# Patient Record
Sex: MALE | Race: OTHER | Employment: UNEMPLOYED | URBAN - METROPOLITAN AREA
[De-identification: names, ages, dates, MRNs, and addresses within clinical notes are randomized per-mention and may not be internally consistent; named-entity substitution may affect disease eponyms.]

---

## 2018-12-20 ENCOUNTER — ED HISTORICAL/CONVERTED ENCOUNTER (OUTPATIENT)
Dept: OTHER | Age: 18
End: 2018-12-20

## 2019-04-11 ENCOUNTER — OFFICE VISIT (OUTPATIENT)
Dept: FAMILY MEDICINE CLINIC | Age: 19
End: 2019-04-11

## 2019-04-11 VITALS
HEART RATE: 54 BPM | BODY MASS INDEX: 19.45 KG/M2 | WEIGHT: 143.6 LBS | RESPIRATION RATE: 18 BRPM | HEIGHT: 72 IN | SYSTOLIC BLOOD PRESSURE: 102 MMHG | DIASTOLIC BLOOD PRESSURE: 68 MMHG | TEMPERATURE: 97.5 F

## 2019-04-11 DIAGNOSIS — G91.9 HYDROCEPHALUS, UNSPECIFIED TYPE (HCC): ICD-10-CM

## 2019-04-11 DIAGNOSIS — F79 INTELLECTUAL DISABILITY: Primary | ICD-10-CM

## 2019-04-11 DIAGNOSIS — F39 MOOD DISORDER (HCC): ICD-10-CM

## 2019-04-11 RX ORDER — OLANZAPINE 20 MG/1
TABLET ORAL
COMMUNITY
Start: 2019-04-09

## 2019-04-11 RX ORDER — LITHIUM CARBONATE 300 MG/1
TABLET, FILM COATED, EXTENDED RELEASE ORAL
COMMUNITY
Start: 2019-04-09

## 2019-04-11 RX ORDER — LAMOTRIGINE 25 MG/1
TABLET ORAL 2 TIMES DAILY
COMMUNITY
Start: 2019-04-09

## 2019-04-11 RX ORDER — CITALOPRAM 20 MG/1
TABLET, FILM COATED ORAL DAILY
COMMUNITY
Start: 2019-04-09

## 2019-04-11 RX ORDER — LITHIUM CARBONATE 450 MG/1
450 TABLET ORAL DAILY
COMMUNITY

## 2019-04-11 RX ORDER — TRAZODONE HYDROCHLORIDE 50 MG/1
TABLET ORAL
COMMUNITY

## 2019-04-11 RX ORDER — BUSPIRONE HYDROCHLORIDE 5 MG/1
TABLET ORAL
COMMUNITY
Start: 2019-04-09

## 2019-04-11 NOTE — PROGRESS NOTES
Chief Complaint   Patient presents with   [facility] Establish Care       1. Have you been to the ER, urgent care clinic since your last visit? Hospitalized since your last visit? No    2. Have you seen or consulted any other health care providers outside of the [address] [place] [address] since your last visit? Include any pap smears or colon screening.  No

## 2019-04-11 NOTE — PROGRESS NOTES
Family Medicine Initial Office Visit  Patient: Jm Baca  2000, 19 y.o., male  Encounter Date: 2019    ASSESSMENT & PLAN    ICD-10-CM ICD-9-CM    1. Intellectual disability F79 319    2. Mood disorder (HCC) F39 296.90    3. Hydrocephalus, unspecified type G91.9 331.4      Orders Placed This Encounter    citalopram (CELEXA) 20 mg tablet     Sig: daily.  OLANZapine (ZYPREXA) 20 mg tablet     Sig: nightly.  busPIRone (BUSPAR) 5 mg tablet     Si tab TID    lithium carbonate SR (LITHOBID) 300 mg CR tablet     Sig: nightly. 2 tabs at night    lamoTRIgine (LAMICTAL) 25 mg tablet     Sig: two (2) times a day. 2 tabs BID    traZODone (DESYREL) 50 mg tablet     Sig: Take  by mouth nightly. 50 mg As needed at HS    lithium carbonate CR (ESKALITH CR) 450 mg CR tablet     Sig: Take 450 mg by mouth daily. 1 tab daily    human papillomav vac,9-mohsen,PF, (GARDASIL 9, PF,) susp injection     Si.5 mL by IntraMUSCular route once for 1 dose. Dispense:  0.5 mL     Refill:  1     Had shot # 1 at prior doctors office, please admin 2 and 3. Last shot in . Ean Parrish is here today with his mom to establish care. I will request records from all of his prior doctors, mom is feeling paperwork out here in the office. She needs paperwork filled out for his medical needs and personal care services referral, A [id number]-[id number]. Have asked her many of these questions in the office today, but require records review before I can complete the form.   I will let her know when the form is completed but I have asked her to call in 2 weeks if she is not heard from us so that I can verify that all of the information is they are  He should continue to follow with psychiatry and take all medications as prescribed  Call with questions or concerns  Gardasil recommended and okay to use Flonase for nasal congestion and postnasal drip  CHIEF COMPLAINT  Chief Complaint   Patient presents with   [address] is a 19 y.o. male presenting today for establishing care. Patient works as disabled, goes to day program [employer]. Patient lives in a house with mom, 2 younger brothers and mom's fiance. Patient was last seen by primary care ([facility])--[facility]ian at [facility] in [place]. Patient last saw a dentist Melonie Clemons within the last year  Patient last had eye exam mom reports maybe a few years. Exercise: plays basketball, goes for walks (Therapeutic)  Diet / Weight: eats a healthy diet, weight is stable. Tobacco: 1/2 PPD cigarette smoking (mom reports they limit)--he was going to pick them up out of the street. EtOH:no  Illicit Substances:no    Psychiatry: follows with [facility], follows with Dr Raine Doe there ([place]) but had a psychological testing at [facility]    Followed in the past with neurosurgery b/c of hydrocephalus. PMH:  Intellectual disability disorder  \"autistic traits\"  Hx of psychosis with avh--was hospitalized \"tons\" of times, most recently [facility] in [facility], before that [address] [facility], once [place] [address] with certificate of completion from [facility] is here with him to advocate for him, has brought all their paperwork and will fill out records requests. Review of Systems   Unable to perform ROS: Psychiatric disorder   Patient is an unreliable historian due to intellectual disability but he denies all complaints today. OBJECTIVE  Visit Vitals  /68 (BP 1 Location: Left arm, BP Patient Position: Sitting)   Pulse (!) 54   Temp 97.5 °F (36.4 °C) (Oral)   Resp 18   Ht 5' 11.5\" (1.816 m)   Wt 143 lb 9.6 oz (65.1 kg)   BMI 19.75 kg/m²       Physical Exam   Constitutional: He is oriented to person, place, and time. He appears well-developed and well-nourished. No distress.    NAD, Nontoxic, Appears Stated Age, tall and thin, answers questions appropriately when asked, defers to mother often   HENT:   Head: Normocephalic and atraumatic. Right Ear: External ear normal.   Left Ear: External ear normal.   Mouth/Throat: Oropharynx is clear and moist. No oropharyngeal exudate. Plagiocephaly   Eyes: Conjunctivae and EOM are normal. Right eye exhibits no discharge. Left eye exhibits no discharge. No scleral icterus. Neck: Neck supple. No thyromegaly present. Cardiovascular: Normal rate, regular rhythm and normal heart sounds. No murmur heard. Pulmonary/Chest: Effort normal and breath sounds normal. No stridor. No respiratory distress. He has no wheezes. He has no rales. Abdominal: Soft. Bowel sounds are normal. He exhibits no distension. There is no tenderness. Well-healed abdominal scar status post surgery and right upper and left upper quadrants   Musculoskeletal: He exhibits no edema or tenderness. Neurological: He is alert and oriented to person, place, and time. Grossly intact CN   Skin: Skin is warm and dry. No rash noted. He is not diaphoretic. Psychiatric: He has a normal mood and affect. His behavior is normal.   Poor eye contact, withdrawn, says \"I do not like when you talk about me hurting people, I do not do it on purpose. \"   Nursing note and vitals reviewed. No results found for any visits on 04/11/19. HISTORICAL  Reviewed and updated today, and as noted below:    History reviewed. No pertinent past medical history.   Past Surgical History:   Procedure Laterality Date    NEUROLOGICAL PROCEDURE UNLISTED       Family History   Problem Relation Age of Onset    No Known Problems Mother     Psychiatric Disorder Father      Social History     Tobacco Use   Smoking Status Current Every Day Smoker   Smokeless Tobacco Never Used     Social History     Socioeconomic History    Marital status: SINGLE     Spouse name: Not on file    Number of children: Not on file    Years of education: Not on file    Highest education level: Not on file   Tobacco Use    Smoking status: Current Every Day Smoker    Smokeless tobacco: Never Used     No Known Allergies    No results found for any previous visit. Shilo Murphy MD  [facility]  04/11/19 8:39 AM    Portions of this note may have been populated using smart dictation software and may have \"sounds-like\" errors present.

## 2019-04-30 ENCOUNTER — TELEPHONE (OUTPATIENT)
Dept: FAMILY MEDICINE CLINIC | Age: 19
End: 2019-04-30

## 2019-04-30 NOTE — TELEPHONE ENCOUNTER
Patient's mother Gege Crawford) is calling inquiring on forms that were being filled out by Dr Keith Osuna. Mother states this was brought in a couple weeks ago.     Best contact: [phone]

## 2019-05-01 NOTE — TELEPHONE ENCOUNTER
I dont believe I have gotten any of his records, can we follow up with mom on how we can facilitate this records release so I can fill out the forms?

## 2019-05-01 NOTE — TELEPHONE ENCOUNTER
Patient's mother is calling again to follow up on the form. She states the Psychiatrist says the medical release form she signed at this office has not been received. Therefore, he is not releasing the necessary information needed to fill out the form. Patient's mother says this is a time-sensitive situation, as the patient could fall off the list.  Please advise.

## 2019-05-01 NOTE — TELEPHONE ENCOUNTER
Patient called again to follow up. I advised her she may want to refill out the medical release forms in order to speed up the process since it seems the original [facility] may [facility].

## 2019-05-02 NOTE — TELEPHONE ENCOUNTER
Pt's mother came in to office, completed signed release. Release has been faxed, confirmation received and mother has been provided with copy. Pt's mother will follow up with psychiatrist for status.

## 2019-05-09 ENCOUNTER — TELEPHONE (OUTPATIENT)
Dept: FAMILY MEDICINE CLINIC | Age: 19
End: 2019-05-09

## 2019-05-09 DIAGNOSIS — F31.60 BIPOLAR AFFECTIVE DISORDER, CURRENT EPISODE MIXED, CURRENT EPISODE SEVERITY UNSPECIFIED (HCC): Primary | ICD-10-CM

## 2019-05-09 DIAGNOSIS — F84.0 AUTISM SPECTRUM DISORDER: ICD-10-CM

## 2019-05-09 NOTE — TELEPHONE ENCOUNTER
----- Message from Lyly Henson sent at 5/8/2019  9:04 AM EDT -----  Regarding: Dr. Saima Pruitt / telephone  Phil Moya called to confirm receipt of Medical records sent from patient's psychiatrist..          Best contact [phone]

## 2019-05-09 NOTE — TELEPHONE ENCOUNTER
Pt's mom called to confirm his records were received by Dr. Qasim Dumont and is requesting call back at [phone] when [facility] forms are completed.   Willian

## 2019-05-10 ENCOUNTER — DOCUMENTATION ONLY (OUTPATIENT)
Dept: FAMILY MEDICINE CLINIC | Age: 19
End: 2019-05-10

## 2019-05-10 PROBLEM — F31.60 BIPOLAR AFFECTIVE DISORDER, CURRENT EPISODE MIXED (HCC): Status: ACTIVE | Noted: 2019-05-10

## 2019-05-10 PROBLEM — F84.0 AUTISM SPECTRUM DISORDER: Status: ACTIVE | Noted: 2019-05-10

## 2019-05-10 NOTE — TELEPHONE ENCOUNTER
Pt's mom states that the last hospitalization was at [facility] on 12/22/18. She states she cannot remember the other dates but that's the only hospital that pt has been in since pt was 23years old.

## 2019-06-18 ENCOUNTER — TELEPHONE (OUTPATIENT)
Dept: FAMILY MEDICINE CLINIC | Age: 19
End: 2019-06-18

## 2019-06-18 NOTE — TELEPHONE ENCOUNTER
Pt's mental health skill builder, Camelia Pimentel stopped by with signed release form from pt requesting medical records be sent to [facility], fax number [phone].   Willian

## 2019-06-24 NOTE — TELEPHONE ENCOUNTER
Pt's release form has been faxed to [facility], medical records department, and conformation has been received.

## 2019-07-03 ENCOUNTER — OFFICE VISIT (OUTPATIENT)
Dept: FAMILY MEDICINE CLINIC | Age: 19
End: 2019-07-03

## 2019-07-03 VITALS
DIASTOLIC BLOOD PRESSURE: 59 MMHG | SYSTOLIC BLOOD PRESSURE: 94 MMHG | HEART RATE: 51 BPM | RESPIRATION RATE: 16 BRPM | HEIGHT: 72 IN | WEIGHT: 145 LBS | BODY MASS INDEX: 19.64 KG/M2 | TEMPERATURE: 98.1 F | OXYGEN SATURATION: 100 %

## 2019-07-03 DIAGNOSIS — F84.0 AUTISM SPECTRUM DISORDER: ICD-10-CM

## 2019-07-03 DIAGNOSIS — F79 INTELLECTUAL DISABILITY: ICD-10-CM

## 2019-07-03 DIAGNOSIS — Z00.00 ROUTINE MEDICAL EXAM: Primary | ICD-10-CM

## 2019-07-03 DIAGNOSIS — F31.60 BIPOLAR AFFECTIVE DISORDER, CURRENT EPISODE MIXED, CURRENT EPISODE SEVERITY UNSPECIFIED (HCC): ICD-10-CM

## 2019-07-03 DIAGNOSIS — M79.674 GREAT TOE PAIN, RIGHT: ICD-10-CM

## 2019-07-03 DIAGNOSIS — F39 MOOD DISORDER (HCC): ICD-10-CM

## 2019-07-03 RX ORDER — AMOXICILLIN AND CLAVULANATE POTASSIUM 500; 125 MG/1; MG/1
TABLET, FILM COATED ORAL
Refills: 0 | COMMUNITY
Start: 2019-06-03 | End: 2019-07-03

## 2019-07-03 NOTE — PROGRESS NOTES
Subjective:   Shereen Ceballos is a 19 y.o. male presenting for his annual checkup. ROS:  Feeling well. No dyspnea or chest pain on exertion. No abdominal pain, change in bowel habits, black or bloody stools. No urinary tract or prostatic symptoms. No neurological complaints. Specific concerns today: need paperwork filled out so that he can continue with day program    R toe pain-[facility], did not find any abnormalities on xray, patient had abx for \"just in case there was an infection\"    Needs to have a tb test today. 1. Have you experienced any of the following symptoms in the past year?   a.) A productive cough for more than 3 weeks? n   b.) Hemoptysis (coughing up blood)? n   c.) Unexplained weight loss?n   d.) Fever, Chills, or night sweats for no known reason? n   e.) Persistent shortness of breath? n   f.) Unexplained fatigue? n   g.) Chest Pain? n  2. Have you had contact with anyone with active tuberculosis disease in the past year? n  3. Do you have a medical condition, or are you taking medications, which suppress  your immune system? n      Patient Active Problem List   Diagnosis Code    Intellectual disability F79    Hydrocephalus G91.9    Mood disorder ([facility] F39    Bipolar affective disorder, current episode mixed ([facility] F31.60    Autism spectrum disorder F84.0     Current Outpatient Medications   Medication Sig Dispense Refill    citalopram (CELEXA) 20 mg tablet daily.  OLANZapine (ZYPREXA) 20 mg tablet nightly.  busPIRone (BUSPAR) 5 mg tablet 1 tab TID      lithium carbonate SR (LITHOBID) 300 mg CR tablet nightly. 2 tabs at night      lamoTRIgine (LAMICTAL) 25 mg tablet two (2) times a day. 2 tabs BID      traZODone (DESYREL) 50 mg tablet Take  by mouth nightly. 50 mg As needed at HS      lithium carbonate CR (ESKALITH CR) 450 mg CR tablet Take 450 mg by mouth daily. 1 tab daily       No Known Allergies  History reviewed.  No pertinent past medical history. Past Surgical History:   Procedure Laterality Date    NEUROLOGICAL PROCEDURE UNLISTED       Family History   Problem Relation Age of Onset    No Known Problems Mother     Psychiatric Disorder Father      Social History     Tobacco Use    Smoking status: Current Every Day Smoker    Smokeless tobacco: Never Used   Substance Use Topics    Alcohol use: Not on file             Objective:     Visit Vitals  BP 94/59 (BP 1 Location: Left arm, BP Patient Position: Sitting)   Pulse (!) 51   Temp 98.1 °F (36.7 °C) (Oral)   Resp 16   Ht 5' 11.5\" (1.816 m)   Wt 145 lb (65.8 kg)   SpO2 100%   BMI 19.94 kg/m²     The patient appears well, alert, oriented x 3, in no distress. ENT normal.  Neck supple. No adenopathy or thyromegaly. PERRLA. Lungs are clear, good air entry, no wheezes, rhonchi or rales. S1 and S2 normal, no murmurs, regular rate and rhythm. Abdomen is soft without tenderness, guarding, mass or organomegaly.  exam: deferred per patient request.  Extremities show no edema, normal peripheral pulses. Neurological is normal without focal findings. 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 ICD-10-CM ICD-9-CM    1. Routine medical exam Z00.00 V70.0    2. Great toe pain, right M79.674 729.5 REFERRAL TO PODIATRY   3. Intellectual disability F79 319    4. Mood disorder (HCC) F39 296.90    5. Bipolar affective disorder, current episode mixed, current episode severity unspecified ([facility] F31.60 296.60    6. Autism spectrum disorder F84.0 299.00    .

## 2019-07-03 NOTE — PROGRESS NOTES
Chief Complaint   Patient presents with    Complete Physical     1. Have you been to the ER, urgent care clinic since your last visit? Hospitalized since your last visit? Yes, 06/19 [facility], Right big toe pain. 2. Have you seen or consulted any other health care providers outside of the [address] [place] [address] since your last visit? Include any pap smears or colon screening.  No

## 2019-07-08 ENCOUNTER — CLINICAL SUPPORT (OUTPATIENT)
Dept: FAMILY MEDICINE CLINIC | Age: 19
End: 2019-07-08

## 2019-07-08 DIAGNOSIS — Z11.1 SCREENING FOR TUBERCULOSIS: Primary | ICD-10-CM

## 2019-07-11 ENCOUNTER — CLINICAL SUPPORT (OUTPATIENT)
Dept: FAMILY MEDICINE CLINIC | Age: 19
End: 2019-07-11

## 2019-07-11 DIAGNOSIS — Z11.1 ENCOUNTER FOR PPD SKIN TEST READING: Primary | ICD-10-CM

## 2019-07-11 LAB
MM INDURATION POC: 0 MM (ref 0–5)
PPD POC: NEGATIVE NEGATIVE

## 2021-05-27 ENCOUNTER — OFFICE VISIT (OUTPATIENT)
Dept: FAMILY MEDICINE CLINIC | Age: 21
End: 2021-05-27
Payer: COMMERCIAL

## 2021-05-27 VITALS
OXYGEN SATURATION: 98 % | HEIGHT: 71 IN | DIASTOLIC BLOOD PRESSURE: 71 MMHG | TEMPERATURE: 96.9 F | BODY MASS INDEX: 21.14 KG/M2 | WEIGHT: 151 LBS | SYSTOLIC BLOOD PRESSURE: 106 MMHG | RESPIRATION RATE: 16 BRPM | HEART RATE: 54 BPM

## 2021-05-27 DIAGNOSIS — F31.60 BIPOLAR AFFECTIVE DISORDER, CURRENT EPISODE MIXED, CURRENT EPISODE SEVERITY UNSPECIFIED (HCC): ICD-10-CM

## 2021-05-27 DIAGNOSIS — Z11.1 SCREENING FOR TUBERCULOSIS: ICD-10-CM

## 2021-05-27 DIAGNOSIS — F39 MOOD DISORDER (HCC): ICD-10-CM

## 2021-05-27 DIAGNOSIS — F84.0 AUTISM SPECTRUM DISORDER: ICD-10-CM

## 2021-05-27 DIAGNOSIS — Z00.00 ROUTINE MEDICAL EXAM: Primary | ICD-10-CM

## 2021-05-27 DIAGNOSIS — Z11.1 ENCOUNTER FOR PPD SKIN TEST READING: ICD-10-CM

## 2021-05-27 DIAGNOSIS — F79 INTELLECTUAL DISABILITY: ICD-10-CM

## 2021-05-27 PROCEDURE — 99395 PREV VISIT EST AGE 18-39: CPT | Performed by: FAMILY MEDICINE

## 2021-05-27 NOTE — PROGRESS NOTES
Subjective:   Claudette Riddles is a 23 y.o. male presenting for his annual checkup. ROS:  Feeling well. No dyspnea or chest pain on exertion. No abdominal pain, change in bowel habits, black or bloody stools. No urinary tract or prostatic symptoms. No neurological complaints. Specific concerns today:  Tb screening. 1. Have you experienced any of the following symptoms in the past year?   a.) A productive cough for more than 3 weeks? no   b.) Hemoptysis (coughing up blood)? no   c.) Unexplained weight loss?no   d.) Fever, Chills, or night sweats for no known reason? no   e.) Persistent shortness of breath? no   f.) Unexplained fatigue? no   g.) Chest Pain? no  2. Have you had contact with anyone with active tuberculosis disease in the past year? no  3. Do you have a medical condition, or are you taking medications, which suppress  your immune system? no    Mood management per psych, stable and compliant  Going to day program doing well there    No complaints today          Objective:     Visit Vitals  /71   Pulse (!) 54   Temp 96.9 °F (36.1 °C) (Tympanic)   Resp 16   Ht 5' 11\" (1.803 m)   Wt 151 lb (68.5 kg)   SpO2 98%   BMI 21.06 kg/m²     The patient appears well, alert, oriented x 3, in no distress. ENT normal.  Neck supple. No adenopathy or thyromegaly. PATSY. Lungs are clear, good air entry, no wheezes, rhonchi or rales. S1 and S2 normal, no murmurs, regular rate and rhythm. Abdomen is soft without tenderness, guarding, mass or organomegaly.  exam: deferred. Extremities show no edema, normal peripheral pulses. Neurological is normal without focal findings. Assessment/Plan:   healthy adult male  Quant gold per requirements of facility. ICD-10-CM ICD-9-CM    1. Routine medical exam  Z00.00 V70.0 QUANTIFERON-TB GOLD PLUS   2. Encounter for PPD skin test reading  Z11.1 V67.59    3. Screening for tuberculosis  Z11.1 V74.1 QUANTIFERON-TB GOLD PLUS   4. Mood disorder (HCC)  F39 296.90    5. Autism spectrum disorder  F84.0 299.00    6. Bipolar affective disorder, current episode mixed, current episode severity unspecified ([facility]  F31.60 296.60    7. Intellectual disability  F79 319    .

## 2021-05-27 NOTE — PROGRESS NOTES
Chief Complaint   Patient presents with    Complete Physical     physical and ppd placement.  no health concerns at this time

## 2021-05-30 LAB
GAMMA INTERFERON BACKGROUND BLD IA-ACNC: 0 IU/ML
M TB IFN-G BLD-IMP: NEGATIVE
M TB IFN-G CD4+ BCKGRND COR BLD-ACNC: 0 IU/ML
MITOGEN IGNF BLD-ACNC: >10 IU/ML
QUANTIFERON INCUBATION, QF1T: NORMAL
QUANTIFERON TB2 AG: 0 IU/ML
SERVICE CMNT-IMP: NORMAL

## 2022-03-18 PROBLEM — F79 INTELLECTUAL DISABILITY: Status: ACTIVE | Noted: 2019-04-11

## 2022-03-19 PROBLEM — F39 MOOD DISORDER (HCC): Status: ACTIVE | Noted: 2019-04-11

## 2022-03-19 PROBLEM — F31.60 BIPOLAR AFFECTIVE DISORDER, CURRENT EPISODE MIXED (HCC): Status: ACTIVE | Noted: 2019-05-10

## 2022-03-20 PROBLEM — G91.9 HYDROCEPHALUS (HCC): Status: ACTIVE | Noted: 2019-04-11

## 2022-03-20 PROBLEM — F84.0 AUTISM SPECTRUM DISORDER: Status: ACTIVE | Noted: 2019-05-10

## 2022-10-17 ENCOUNTER — OFFICE VISIT (OUTPATIENT)
Dept: FAMILY MEDICINE CLINIC | Age: 22
End: 2022-10-17
Payer: COMMERCIAL

## 2022-10-17 ENCOUNTER — TELEPHONE (OUTPATIENT)
Dept: FAMILY MEDICINE CLINIC | Age: 22
End: 2022-10-17

## 2022-10-17 ENCOUNTER — HOSPITAL ENCOUNTER (OUTPATIENT)
Dept: NON INVASIVE DIAGNOSTICS | Age: 22
Discharge: HOME | End: 2022-10-17
Payer: COMMERCIAL

## 2022-10-17 ENCOUNTER — HOSPITAL ENCOUNTER (OUTPATIENT)
Dept: GENERAL RADIOLOGY | Age: 22
Discharge: HOME | End: 2022-10-17
Payer: COMMERCIAL

## 2022-10-17 VITALS
TEMPERATURE: 97 F | DIASTOLIC BLOOD PRESSURE: 84 MMHG | RESPIRATION RATE: 20 BRPM | BODY MASS INDEX: 21.14 KG/M2 | HEART RATE: 50 BPM | HEIGHT: 71 IN | OXYGEN SATURATION: 97 % | SYSTOLIC BLOOD PRESSURE: 128 MMHG | WEIGHT: 151 LBS

## 2022-10-17 DIAGNOSIS — G91.9 HYDROCEPHALUS, UNSPECIFIED TYPE (HCC): ICD-10-CM

## 2022-10-17 DIAGNOSIS — R05.1 ACUTE COUGH: Primary | ICD-10-CM

## 2022-10-17 DIAGNOSIS — Z00.00 ROUTINE GENERAL MEDICAL EXAMINATION AT A HEALTH CARE FACILITY: ICD-10-CM

## 2022-10-17 DIAGNOSIS — Z23 ENCOUNTER FOR IMMUNIZATION: ICD-10-CM

## 2022-10-17 DIAGNOSIS — R05.1 ACUTE COUGH: ICD-10-CM

## 2022-10-17 DIAGNOSIS — R00.1 BRADYCARDIA: ICD-10-CM

## 2022-10-17 DIAGNOSIS — F79 INTELLECTUAL DISABILITY: ICD-10-CM

## 2022-10-17 PROCEDURE — 93005 ELECTROCARDIOGRAM TRACING: CPT

## 2022-10-17 PROCEDURE — 71046 X-RAY EXAM CHEST 2 VIEWS: CPT

## 2022-10-17 PROCEDURE — 99213 OFFICE O/P EST LOW 20 MIN: CPT | Performed by: FAMILY MEDICINE

## 2022-10-17 PROCEDURE — 99395 PREV VISIT EST AGE 18-39: CPT | Performed by: FAMILY MEDICINE

## 2022-10-17 RX ORDER — TETANUS TOXOID, REDUCED DIPHTHERIA TOXOID AND ACELLULAR PERTUSSIS VACCINE, ADSORBED 5; 2.5; 8; 8; 2.5 [IU]/.5ML; [IU]/.5ML; UG/.5ML; UG/.5ML; UG/.5ML
0.5 SUSPENSION INTRAMUSCULAR ONCE
Qty: 0.5 ML | Refills: 0 | Status: SHIPPED | OUTPATIENT
Start: 2022-10-17 | End: 2022-10-17 | Stop reason: SDUPTHER

## 2022-10-17 NOTE — PROGRESS NOTES
HISTORY OF PRESENT ILLNESS  Lorena Pacheco is a 19 y.o. male. Patient presents with:  Physical: No Concerns   Cough: M0rssae     The cough has gotten better but has not resolved. His mother is with him and gives most of the history due to the patient's intellectual disability. Review of Systems   Constitutional:  Negative for chills, fever and malaise/fatigue. Respiratory:  Positive for cough. Negative for sputum production, shortness of breath and wheezing. Gastrointestinal:  Negative for heartburn, nausea and vomiting. Physical Exam  Vitals and nursing note reviewed. Constitutional:       General: He is not in acute distress. Appearance: He is well-developed. He is not diaphoretic. Cardiovascular:      Rate and Rhythm: Normal rate and regular rhythm. Heart sounds: Normal heart sounds. No murmur heard. No friction rub. No gallop. Pulmonary:      Effort: Pulmonary effort is normal. No respiratory distress. Breath sounds: Normal breath sounds. No wheezing or rales. Skin:     General: Skin is warm and dry. Neurological:      Mental Status: He is alert. ASSESSMENT and PLAN    ICD-10-CM ICD-9-CM    1. Acute cough  R05.1 786.2 XR CHEST PA LAT      2. Bradycardia  R00.1 427.89 EKG, 12 LEAD, INITIAL      3. Hydrocephalus, unspecified type (HCC)  G91.9 331.4 pneumococcal 20-mohsen conj-dip, PF, (PREVNAR 20) 0.5 mL syrg injection      4. Intellectual disability  F79 319       5. Routine general medical examination at a health care facility  B66.42 K48.7 METABOLIC PANEL, COMPREHENSIVE      CBC WITH AUTOMATED DIFF      LIPID PANEL      METABOLIC PANEL, COMPREHENSIVE      CBC WITH AUTOMATED DIFF      LIPID PANEL      6.  Encounter for immunization  Z23 V03.89 pneumococcal 20-mohsen conj-dip, PF, (PREVNAR 20) 0.5 mL syrg injection      diphth,pertus,acell,,tetanus (Boostrix Tdap) 2.5-8-5 Lf-mcg-Lf/0.5mL susp suspension          Cough continues to resolve  chest X-ray  EKG    Follow-up and Dispositions    Return in about 1 year (around 10/17/2023) for physical/if cough not resolved in 2-3 weeks. Reviewed plan of care. Patient has provided input and agrees with goals. Subjective:  Víctor Nevarez is a18 y.o. y.o. male presenting for his annual checkup. 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 No dyspnea or chest pain on exertion. No abdominal pain, change in bowel habits, black or bloody stools. No urinary tract or prostatic symptoms. No neurological complaints. Patient Active Problem List   Diagnosis Code    Intellectual disability F79    Hydrocephalus (HCC) G91.9    Mood disorder ([facility] F39    Bipolar affective disorder, current episode mixed ([facility] F31.60    Autism spectrum disorder F84.0     Patient Active Problem List    Diagnosis Date Noted    Bipolar affective disorder, current episode mixed ([facility] 05/10/2019    Autism spectrum disorder 05/10/2019    Intellectual disability 04/11/2019    Hydrocephalus ([facility] 04/11/2019    Mood disorder ([facility] 04/11/2019     Current Outpatient Medications   Medication Sig Dispense Refill    citalopram (CELEXA) 20 mg tablet daily. OLANZapine (ZYPREXA) 20 mg tablet nightly. busPIRone (BUSPAR) 5 mg tablet 1 tab TID      lithium carbonate SR (LITHOBID) 300 mg CR tablet nightly. 2 tabs at night      lamoTRIgine (LAMICTAL) 25 mg tablet two (2) times a day. 2 tabs BID      traZODone (DESYREL) 50 mg tablet Take  by mouth nightly. 50 mg As needed at HS      lithium carbonate CR (ESKALITH CR) 450 mg CR tablet Take 450 mg by mouth daily. 1 tab daily       No Known Allergies  History reviewed. No pertinent past medical history.   Past Surgical History: Procedure Laterality Date    NEUROLOGICAL PROCEDURE UNLISTED       Family History   Problem Relation Age of Onset    No Known Problems Mother     Psychiatric Disorder Father      Social History     Tobacco Use    Smoking status: Every Day    Smokeless tobacco: Never   Substance Use Topics    Alcohol use: Not on file        Objective:    Visit Vitals  /84   Pulse (!) 50   Temp 97 °F (36.1 °C) (Temporal)   Resp 20   Ht 5' 11\" (1.803 m)   Wt 151 lb (68.5 kg)   SpO2 97%   BMI 21.06 kg/m²     The patient appears well, alert, oriented x 3, in no distress. ENT normal.  Neck supple. No adenopathy or thyromegaly. PATSY. Lungs are clear, good air entry, no wheezes, rhonchi or rales. S1 and S2 normal, no murmurs, regular rate and rhythm. Abdomen is soft without tenderness, guarding, mass or organomegaly.  exam: patient declined. Extremities show no edema, normal peripheral pulses. Neurological is normal without focal findings. Assessment/Plan:    ICD-10-CM ICD-9-CM    1. Acute cough  R05.1 786.2 XR CHEST PA LAT      2. Bradycardia  R00.1 427.89 EKG, 12 LEAD, INITIAL      3. Hydrocephalus, unspecified type (HCC)  G91.9 331.4 pneumococcal 20-mohsen conj-dip, PF, (PREVNAR 20) 0.5 mL syrg injection      4. Intellectual disability  F79 319       5. Routine general medical examination at a health care facility  R28.15 N23.8 METABOLIC PANEL, COMPREHENSIVE      CBC WITH AUTOMATED DIFF      LIPID PANEL      METABOLIC PANEL, COMPREHENSIVE      CBC WITH AUTOMATED DIFF      LIPID PANEL      6. Encounter for immunization  Z23 V03.89 pneumococcal 20-mohsen conj-dip, PF, (PREVNAR 20) 0.5 mL syrg injection      diphth,pertus,acell,,tetanus (Boostrix Tdap) 2.5-8-5 Lf-mcg-Lf/0.5mL susp suspension          Labs per orders. Continue current plans. Prevnar 20 and TDAP at pharmacy     Follow-up and Dispositions    Return in about 1 year (around 10/17/2023) for physical/if cough not resolved in 2-3 weeks.      .      Reviewed plan of care.  Patient has provided input and agrees with goals.

## 2022-10-19 LAB
ATRIAL RATE: 47 BPM
CALCULATED P AXIS, ECG09: -15 DEGREES
CALCULATED R AXIS, ECG10: 70 DEGREES
CALCULATED T AXIS, ECG11: 65 DEGREES
DIAGNOSIS, 93000: NORMAL
P-R INTERVAL, ECG05: 140 MS
Q-T INTERVAL, ECG07: 452 MS
QRS DURATION, ECG06: 102 MS
QTC CALCULATION (BEZET), ECG08: 400 MS
VENTRICULAR RATE, ECG03: 47 BPM

## 2022-10-19 RX ORDER — TETANUS TOXOID, REDUCED DIPHTHERIA TOXOID AND ACELLULAR PERTUSSIS VACCINE, ADSORBED 5; 2.5; 8; 8; 2.5 [IU]/.5ML; [IU]/.5ML; UG/.5ML; UG/.5ML; UG/.5ML
0.5 SUSPENSION INTRAMUSCULAR ONCE
Qty: 0.5 ML | Refills: 0 | Status: SHIPPED | OUTPATIENT
Start: 2022-10-19 | End: 2022-10-19

## 2022-11-02 LAB — HBA1C MFR BLD HPLC: 5.3 %

## 2022-11-10 ENCOUNTER — TELEPHONE (OUTPATIENT)
Dept: FAMILY MEDICINE CLINIC | Age: 22
End: 2022-11-10

## 2022-11-10 DIAGNOSIS — R00.1 SINUS BRADYCARDIA BY ELECTROCARDIOGRAM: Primary | ICD-10-CM

## 2022-11-10 NOTE — TELEPHONE ENCOUNTER
Please call patient and let him know his heart rate is very low on his EKG.   I would like for him to see Cardiology and have printed a referral request.

## 2022-11-11 ENCOUNTER — TELEPHONE (OUTPATIENT)
Dept: FAMILY MEDICINE CLINIC | Age: 22
End: 2022-11-11

## 2022-11-11 NOTE — TELEPHONE ENCOUNTER
Please call patient and let him know I received his labs and several are abnormal.  He needs to schedule a visit about these.

## 2024-03-26 ENCOUNTER — OFFICE VISIT (OUTPATIENT)
Facility: CLINIC | Age: 24
End: 2024-03-26
Payer: COMMERCIAL

## 2024-03-26 VITALS
TEMPERATURE: 97 F | HEIGHT: 71 IN | DIASTOLIC BLOOD PRESSURE: 71 MMHG | HEART RATE: 62 BPM | RESPIRATION RATE: 20 BRPM | OXYGEN SATURATION: 98 % | WEIGHT: 148 LBS | BODY MASS INDEX: 20.72 KG/M2 | SYSTOLIC BLOOD PRESSURE: 107 MMHG

## 2024-03-26 DIAGNOSIS — Z86.69 PERSONAL HISTORY OF HYDROCEPHALUS: ICD-10-CM

## 2024-03-26 DIAGNOSIS — Z71.89 ACP (ADVANCE CARE PLANNING): ICD-10-CM

## 2024-03-26 DIAGNOSIS — Z87.891 PERSONAL HISTORY OF TOBACCO USE, PRESENTING HAZARDS TO HEALTH: ICD-10-CM

## 2024-03-26 DIAGNOSIS — E78.00 PURE HYPERCHOLESTEROLEMIA: ICD-10-CM

## 2024-03-26 DIAGNOSIS — Z00.00 ENCOUNTER FOR WELL ADULT EXAM WITHOUT ABNORMAL FINDINGS: Primary | ICD-10-CM

## 2024-03-26 DIAGNOSIS — F31.61 BIPOLAR DISORDER, CURRENT EPISODE MIXED, MILD (HCC): ICD-10-CM

## 2024-03-26 PROBLEM — F39 MOOD DISORDER (HCC): Status: RESOLVED | Noted: 2019-04-11 | Resolved: 2024-03-26

## 2024-03-26 PROCEDURE — 99406 BEHAV CHNG SMOKING 3-10 MIN: CPT | Performed by: FAMILY MEDICINE

## 2024-03-26 PROCEDURE — 99395 PREV VISIT EST AGE 18-39: CPT | Performed by: FAMILY MEDICINE

## 2024-03-26 SDOH — ECONOMIC STABILITY: HOUSING INSECURITY
IN THE LAST 12 MONTHS, WAS THERE A TIME WHEN YOU DID NOT HAVE A STEADY PLACE TO SLEEP OR SLEPT IN A SHELTER (INCLUDING NOW)?: NO

## 2024-03-26 SDOH — ECONOMIC STABILITY: FOOD INSECURITY: WITHIN THE PAST 12 MONTHS, THE FOOD YOU BOUGHT JUST DIDN'T LAST AND YOU DIDN'T HAVE MONEY TO GET MORE.: NEVER TRUE

## 2024-03-26 SDOH — ECONOMIC STABILITY: INCOME INSECURITY: HOW HARD IS IT FOR YOU TO PAY FOR THE VERY BASICS LIKE FOOD, HOUSING, MEDICAL CARE, AND HEATING?: NOT HARD AT ALL

## 2024-03-26 SDOH — ECONOMIC STABILITY: FOOD INSECURITY: WITHIN THE PAST 12 MONTHS, YOU WORRIED THAT YOUR FOOD WOULD RUN OUT BEFORE YOU GOT MONEY TO BUY MORE.: NEVER TRUE

## 2024-03-26 ASSESSMENT — PATIENT HEALTH QUESTIONNAIRE - PHQ9
SUM OF ALL RESPONSES TO PHQ9 QUESTIONS 1 & 2: 0
8. MOVING OR SPEAKING SO SLOWLY THAT OTHER PEOPLE COULD HAVE NOTICED. OR THE OPPOSITE, BEING SO FIGETY OR RESTLESS THAT YOU HAVE BEEN MOVING AROUND A LOT MORE THAN USUAL: NOT AT ALL
2. FEELING DOWN, DEPRESSED OR HOPELESS: NOT AT ALL
SUM OF ALL RESPONSES TO PHQ QUESTIONS 1-9: 0
1. LITTLE INTEREST OR PLEASURE IN DOING THINGS: NOT AT ALL
4. FEELING TIRED OR HAVING LITTLE ENERGY: NOT AT ALL
SUM OF ALL RESPONSES TO PHQ QUESTIONS 1-9: 0
5. POOR APPETITE OR OVEREATING: NOT AT ALL
SUM OF ALL RESPONSES TO PHQ QUESTIONS 1-9: 0
9. THOUGHTS THAT YOU WOULD BE BETTER OFF DEAD, OR OF HURTING YOURSELF: NOT AT ALL
1. LITTLE INTEREST OR PLEASURE IN DOING THINGS: NOT AT ALL
7. TROUBLE CONCENTRATING ON THINGS, SUCH AS READING THE NEWSPAPER OR WATCHING TELEVISION: NOT AT ALL
SUM OF ALL RESPONSES TO PHQ QUESTIONS 1-9: 0
SUM OF ALL RESPONSES TO PHQ QUESTIONS 1-9: 0
2. FEELING DOWN, DEPRESSED OR HOPELESS: NOT AT ALL
10. IF YOU CHECKED OFF ANY PROBLEMS, HOW DIFFICULT HAVE THESE PROBLEMS MADE IT FOR YOU TO DO YOUR WORK, TAKE CARE OF THINGS AT HOME, OR GET ALONG WITH OTHER PEOPLE: NOT DIFFICULT AT ALL
SUM OF ALL RESPONSES TO PHQ9 QUESTIONS 1 & 2: 0
6. FEELING BAD ABOUT YOURSELF - OR THAT YOU ARE A FAILURE OR HAVE LET YOURSELF OR YOUR FAMILY DOWN: NOT AT ALL
3. TROUBLE FALLING OR STAYING ASLEEP: NOT AT ALL
SUM OF ALL RESPONSES TO PHQ QUESTIONS 1-9: 0

## 2024-03-26 NOTE — PATIENT INSTRUCTIONS
Learning About Benefits of Quitting Smoking  Quitting smoking helps your body in many ways. Quitting lowers your risk for cancer, lung disease, heart attack, stroke, blood vessel disease, and blindness. You'll also get sick less often and heal faster. And after you quit, you may find that your mood is better and you are less stressed.  When and how will you feel healthier after quitting smoking?    In the first hours or days:    Your blood pressure and heart rate go down.  Your oxygen levels increase.    Within weeks or months:    You will cough less and breathe deeper. It may be easier to be active.  Your sense of taste and smell should return.    Over the years:    Your risks of heart disease, heart attack, and stroke are lower.  Your risk of lung cancer is cut by about half after about 10 years. And your risk for other cancers is lower too.  How would quitting help others in your life?    Their heart, lung, and cancer risks may drop, much like yours. They will also be sick less.   If you are or will be pregnant someday, quitting smoking means a healthier .   Where can you learn more?  Go to https://www.ipnexus.net/patientEd and enter O319 to learn more about \"Learning About Benefits of Quitting Smoking.\"  Current as of: November 15, 2023               Content Version: 14.0   [employer].   Care instructions adapted under license by [employer]. If you have questions about a medical condition or this instruction, always ask your healthcare professional. [employer] disclaims any warranty or liability for your use of this information.           Well Visit, Ages 18 to 65: Care Instructions  Well visits can help you stay healthy. Your doctor has checked your overall health and may have suggested ways to take good care of yourself. Your doctor also may have recommended tests. You can help prevent illness with healthy eating, good sleep, vaccinations, regular

## 2024-03-26 NOTE — PROGRESS NOTES
Chief Complaint   Patient presents with    Annual Exam     Physical Form to be compelted.  
       Health Maintenance   Topic Date Due    Hepatitis B vaccine (1 of 3 - 3-dose series) Never done    Varicella vaccine (1 of 2 - 2-dose childhood series) Never done    Pneumococcal 0-64 years Vaccine (1 of 2 - PCV) Never done    HIV screen  Never done    DTaP/Tdap/Td vaccine (1 - Tdap) Never done    HPV vaccine (2 - Male 3-dose series) 05/14/2019    Flu vaccine (1) Never done    COVID-19 Vaccine (2 - 2023-24 season) 09/01/2023    Depression Monitoring  10/17/2023    Hepatitis A vaccine  Aged Out    Hib vaccine  Aged Out    Polio vaccine  Aged Out    Meningococcal (ACWY) vaccine  Aged Out    Hepatitis C screen  Discontinued     Recommendations for Preventive Services Due: see orders and patient instructions/AVS.    No follow-ups on file.

## 2025-03-14 ENCOUNTER — HOSPITAL ENCOUNTER (EMERGENCY)
Facility: HOSPITAL | Age: 25
Discharge: HOME | End: 2025-03-14
Attending: STUDENT IN AN ORGANIZED HEALTH CARE EDUCATION/TRAINING PROGRAM
Payer: COMMERCIAL

## 2025-03-14 ENCOUNTER — APPOINTMENT (OUTPATIENT)
Facility: HOSPITAL | Age: 25
End: 2025-03-14
Payer: COMMERCIAL

## 2025-03-14 VITALS
DIASTOLIC BLOOD PRESSURE: 63 MMHG | SYSTOLIC BLOOD PRESSURE: 101 MMHG | OXYGEN SATURATION: 98 % | TEMPERATURE: 98.6 F | HEART RATE: 60 BPM | WEIGHT: 148 LBS | HEIGHT: 71 IN | BODY MASS INDEX: 20.72 KG/M2 | RESPIRATION RATE: 17 BRPM

## 2025-03-14 DIAGNOSIS — R51.9 ACUTE NONINTRACTABLE HEADACHE, UNSPECIFIED HEADACHE TYPE: Primary | ICD-10-CM

## 2025-03-14 LAB
ALBUMIN SERPL-MCNC: 4.4 G/DL (ref 3.5–5)
ALBUMIN/GLOB SERPL: 1.3 (ref 1.1–2.2)
ALP SERPL-CCNC: 105 U/L (ref 45–117)
ALT SERPL-CCNC: 16 U/L (ref 12–78)
ANION GAP SERPL CALC-SCNC: 6 MMOL/L (ref 2–12)
AST SERPL-CCNC: 10 U/L (ref 15–37)
BASOPHILS # BLD: 0.03 K/UL (ref 0–0.1)
BASOPHILS NFR BLD: 0.4 % (ref 0–1)
BILIRUB SERPL-MCNC: 0.8 MG/DL (ref 0.2–1)
BUN SERPL-MCNC: 12 MG/DL (ref 6–20)
BUN/CREAT SERPL: 9 (ref 12–20)
CALCIUM SERPL-MCNC: 10.1 MG/DL (ref 8.5–10.1)
CHLORIDE SERPL-SCNC: 109 MMOL/L (ref 97–108)
CO2 SERPL-SCNC: 25 MMOL/L (ref 21–32)
CREAT SERPL-MCNC: 1.33 MG/DL (ref 0.7–1.3)
DIFFERENTIAL METHOD BLD: ABNORMAL
EOSINOPHIL # BLD: 0.02 K/UL (ref 0–0.4)
EOSINOPHIL NFR BLD: 0.3 % (ref 0–7)
ERYTHROCYTE [DISTWIDTH] IN BLOOD BY AUTOMATED COUNT: 12.4 % (ref 11.5–14.5)
GLOBULIN SER CALC-MCNC: 3.4 G/DL (ref 2–4)
GLUCOSE SERPL-MCNC: 90 MG/DL (ref 65–100)
HCT VFR BLD AUTO: 51.3 % (ref 36.6–50.3)
HGB BLD-MCNC: 17 G/DL (ref 12.1–17)
IMM GRANULOCYTES # BLD AUTO: 0.02 K/UL (ref 0–0.04)
IMM GRANULOCYTES NFR BLD AUTO: 0.3 % (ref 0–0.5)
LYMPHOCYTES # BLD: 1.38 K/UL (ref 0.8–3.5)
LYMPHOCYTES NFR BLD: 18.7 % (ref 12–49)
MCH RBC QN AUTO: 31 PG (ref 26–34)
MCHC RBC AUTO-ENTMCNC: 33.1 G/DL (ref 30–36.5)
MCV RBC AUTO: 93.4 FL (ref 80–99)
MONOCYTES # BLD: 0.53 K/UL (ref 0–1)
MONOCYTES NFR BLD: 7.2 % (ref 5–13)
NEUTS SEG # BLD: 5.41 K/UL (ref 1.8–8)
NEUTS SEG NFR BLD: 73.1 % (ref 32–75)
NRBC # BLD: 0 K/UL (ref 0–0.01)
NRBC BLD-RTO: 0 PER 100 WBC
PLATELET # BLD AUTO: 176 K/UL (ref 150–400)
PMV BLD AUTO: 12.8 FL (ref 8.9–12.9)
POTASSIUM SERPL-SCNC: 4.8 MMOL/L (ref 3.5–5.1)
PROT SERPL-MCNC: 7.8 G/DL (ref 6.4–8.2)
RBC # BLD AUTO: 5.49 M/UL (ref 4.1–5.7)
SODIUM SERPL-SCNC: 140 MMOL/L (ref 136–145)
WBC # BLD AUTO: 7.4 K/UL (ref 4.1–11.1)

## 2025-03-14 PROCEDURE — 96375 TX/PRO/DX INJ NEW DRUG ADDON: CPT

## 2025-03-14 PROCEDURE — 94761 N-INVAS EAR/PLS OXIMETRY MLT: CPT

## 2025-03-14 PROCEDURE — 70450 CT HEAD/BRAIN W/O DYE: CPT

## 2025-03-14 PROCEDURE — 36415 COLL VENOUS BLD VENIPUNCTURE: CPT

## 2025-03-14 PROCEDURE — 2580000003 HC RX 258: Performed by: STUDENT IN AN ORGANIZED HEALTH CARE EDUCATION/TRAINING PROGRAM

## 2025-03-14 PROCEDURE — 80053 COMPREHEN METABOLIC PANEL: CPT

## 2025-03-14 PROCEDURE — 85025 COMPLETE CBC W/AUTO DIFF WBC: CPT

## 2025-03-14 PROCEDURE — 96374 THER/PROPH/DIAG INJ IV PUSH: CPT

## 2025-03-14 PROCEDURE — 71045 X-RAY EXAM CHEST 1 VIEW: CPT

## 2025-03-14 PROCEDURE — 99284 EMERGENCY DEPT VISIT MOD MDM: CPT

## 2025-03-14 PROCEDURE — 6360000002 HC RX W HCPCS: Performed by: STUDENT IN AN ORGANIZED HEALTH CARE EDUCATION/TRAINING PROGRAM

## 2025-03-14 RX ORDER — MORPHINE SULFATE 4 MG/ML
4 INJECTION, SOLUTION INTRAMUSCULAR; INTRAVENOUS
Refills: 0 | Status: COMPLETED | OUTPATIENT
Start: 2025-03-14 | End: 2025-03-14

## 2025-03-14 RX ORDER — ONDANSETRON 2 MG/ML
4 INJECTION INTRAMUSCULAR; INTRAVENOUS ONCE
Status: COMPLETED | OUTPATIENT
Start: 2025-03-14 | End: 2025-03-14

## 2025-03-14 RX ORDER — 0.9 % SODIUM CHLORIDE 0.9 %
1000 INTRAVENOUS SOLUTION INTRAVENOUS ONCE
Status: COMPLETED | OUTPATIENT
Start: 2025-03-14 | End: 2025-03-14

## 2025-03-14 RX ADMIN — SODIUM CHLORIDE 1000 ML: 0.9 INJECTION, SOLUTION INTRAVENOUS at 13:03

## 2025-03-14 RX ADMIN — MORPHINE SULFATE 4 MG: 4 INJECTION, SOLUTION INTRAMUSCULAR; INTRAVENOUS at 13:05

## 2025-03-14 RX ADMIN — ONDANSETRON 4 MG: 2 INJECTION, SOLUTION INTRAMUSCULAR; INTRAVENOUS at 13:05

## 2025-03-14 ASSESSMENT — ENCOUNTER SYMPTOMS
VOMITING: 0
NAUSEA: 0

## 2025-03-14 ASSESSMENT — PAIN DESCRIPTION - LOCATION
LOCATION: HEAD
LOCATION: HEAD

## 2025-03-14 ASSESSMENT — PAIN - FUNCTIONAL ASSESSMENT: PAIN_FUNCTIONAL_ASSESSMENT: 0-10

## 2025-03-14 ASSESSMENT — PAIN DESCRIPTION - DESCRIPTORS
DESCRIPTORS: ACHING
DESCRIPTORS: TINGLING

## 2025-03-14 ASSESSMENT — PAIN SCALES - GENERAL: PAINLEVEL_OUTOF10: 10

## 2025-03-14 NOTE — ED TRIAGE NOTES
12:19 PM  I have evaluated the patient as the Provider in Rapid Medical Evaluation (RME). I have reviewed his vital signs and the triage nurse assessment. I have talked with the patient and any available family and advised that I am the provider in triage and have ordered the appropriate study to initiate their work up based on the clinical presentation during my assessment. I have advised that the patient will be accommodated in the Main ED as soon as possible. I have also requested to contact the triage nurse or myself immediately if the patient experiences any changes in their condition during this brief waiting period.    24 year old male with complaints of 10/10 headache. Hx: hydrocephalus with  shunts.No visual changes, no weakness, no neurological deficits.  RAUL Friend - NP

## 2025-03-14 NOTE — ED TRIAGE NOTES
Patient arrived ambulatory with mother via POV with cc headache x 2 days.     Hx hydrocephalus with  shunts

## 2025-03-14 NOTE — ED PROVIDER NOTES
[facility] EMERGENCY DEPARTMENT  EMERGENCY DEPARTMENT ENCOUNTER      Pt Name: Luis Weber  MRN: [id number]  Birthdate 2000  Date of evaluation: 3/14/2025  Provider: Colby Mcfarland DO    CHIEF COMPLAINT       Chief Complaint   Patient presents with    Headache         HISTORY OF PRESENT ILLNESS   (Location/Symptom, Timing/Onset, Context/Setting, Quality, Duration, Modifying Factors, Severity)  Note limiting factors.   24-year-old male presents ED for evaluation of headache for 3 days, patient has a past medical history of hydrocephalus and a  shunt placed when he was 1 years old [place].  Only complication was when he was 11.  Otherwise has been healthy otherwise.  Patient reports a frontal headache that is been going on for 3 days, was taken ibuprofen without improvement of symptoms, no fevers no vision changes, no weakness no numbness.  No vomiting.            Review of External Medical Records:     Nursing Notes were reviewed.    REVIEW OF SYSTEMS    (2-9 systems for level 4, 10 or more for level 5)     Review of Systems   Constitutional:  Negative for fever.   Eyes:  Negative for visual disturbance.   Gastrointestinal:  Negative for nausea and vomiting.   Musculoskeletal:  Negative for neck pain.   Neurological:  Positive for headaches. Negative for weakness and numbness.       Except as noted above the remainder of the review of systems was reviewed and negative.       PAST MEDICAL HISTORY   No past medical history on file.      SURGICAL HISTORY       Past Surgical History:   Procedure Laterality Date    NEUROLOGICAL SURGERY           CURRENT MEDICATIONS       Previous Medications    BUSPIRONE (BUSPAR) 5 MG TABLET    1 tab TID    CITALOPRAM (CELEXA) 20 MG TABLET    daily    LAMOTRIGINE (LAMICTAL) 25 MG TABLET    2 times daily    LITHIUM (ESKALITH) 450 MG EXTENDED RELEASE TABLET    Take 1 tablet by mouth daily    LITHIUM (LITHOBID) 300 MG EXTENDED RELEASE TABLET    nightly.  medications on file         (Please note that portions of this note were completed with a voice recognition program.  Efforts were made to edit the dictations but occasionally words are mis-transcribed.)    Colby Mcfarland DO (electronically signed)  Emergency Attending Physician / Physician Assistant / Nurse Practitioner              Colby Mcfarland DO  03/14/25 1522

## 2025-08-27 ENCOUNTER — OFFICE VISIT (OUTPATIENT)
Facility: CLINIC | Age: 25
End: 2025-08-27
Payer: COMMERCIAL

## 2025-08-27 VITALS
DIASTOLIC BLOOD PRESSURE: 67 MMHG | SYSTOLIC BLOOD PRESSURE: 111 MMHG | BODY MASS INDEX: 18.83 KG/M2 | OXYGEN SATURATION: 98 % | HEIGHT: 71 IN | HEART RATE: 87 BPM | WEIGHT: 134.5 LBS

## 2025-08-27 DIAGNOSIS — Z87.891 PERSONAL HISTORY OF TOBACCO USE, PRESENTING HAZARDS TO HEALTH: ICD-10-CM

## 2025-08-27 DIAGNOSIS — E78.00 PURE HYPERCHOLESTEROLEMIA: ICD-10-CM

## 2025-08-27 DIAGNOSIS — Z86.69 PERSONAL HISTORY OF HYDROCEPHALUS: ICD-10-CM

## 2025-08-27 DIAGNOSIS — Z00.00 ENCOUNTER FOR WELL ADULT EXAM WITHOUT ABNORMAL FINDINGS: Primary | ICD-10-CM

## 2025-08-27 DIAGNOSIS — F31.61 BIPOLAR DISORDER, CURRENT EPISODE MIXED, MILD (HCC): ICD-10-CM

## 2025-08-27 PROCEDURE — 99395 PREV VISIT EST AGE 18-39: CPT

## 2025-08-27 PROCEDURE — 99406 BEHAV CHNG SMOKING 3-10 MIN: CPT

## 2025-08-27 RX ORDER — LITHIUM CARBONATE 300 MG/1
300 CAPSULE ORAL 2 TIMES DAILY WITH MEALS
COMMUNITY

## 2025-08-27 SDOH — ECONOMIC STABILITY: FOOD INSECURITY: WITHIN THE PAST 12 MONTHS, THE FOOD YOU BOUGHT JUST DIDN'T LAST AND YOU DIDN'T HAVE MONEY TO GET MORE.: NEVER TRUE

## 2025-08-27 SDOH — ECONOMIC STABILITY: FOOD INSECURITY: WITHIN THE PAST 12 MONTHS, YOU WORRIED THAT YOUR FOOD WOULD RUN OUT BEFORE YOU GOT MONEY TO BUY MORE.: NEVER TRUE
